# Patient Record
Sex: MALE | Race: WHITE | Employment: FULL TIME | URBAN - NONMETROPOLITAN AREA
[De-identification: names, ages, dates, MRNs, and addresses within clinical notes are randomized per-mention and may not be internally consistent; named-entity substitution may affect disease eponyms.]

---

## 2018-07-06 ENCOUNTER — OFFICE VISIT (OUTPATIENT)
Dept: FAMILY MEDICINE | Facility: CLINIC | Age: 28
End: 2018-07-06
Payer: COMMERCIAL

## 2018-07-06 VITALS
RESPIRATION RATE: 18 BRPM | HEIGHT: 70 IN | HEART RATE: 68 BPM | SYSTOLIC BLOOD PRESSURE: 120 MMHG | WEIGHT: 193 LBS | TEMPERATURE: 97.8 F | DIASTOLIC BLOOD PRESSURE: 86 MMHG | BODY MASS INDEX: 27.63 KG/M2

## 2018-07-06 DIAGNOSIS — R22.2 LUMP OF SKIN OF BACK: ICD-10-CM

## 2018-07-06 DIAGNOSIS — R19.09 GROIN LUMP: Primary | ICD-10-CM

## 2018-07-06 LAB
ALBUMIN UR-MCNC: NEGATIVE MG/DL
APPEARANCE UR: CLEAR
BILIRUB UR QL STRIP: NEGATIVE
COLOR UR AUTO: YELLOW
GLUCOSE UR STRIP-MCNC: NEGATIVE MG/DL
HGB UR QL STRIP: NEGATIVE
KETONES UR STRIP-MCNC: NEGATIVE MG/DL
LEUKOCYTE ESTERASE UR QL STRIP: NEGATIVE
NITRATE UR QL: NEGATIVE
PH UR STRIP: 7 PH (ref 5–7)
RBC #/AREA URNS AUTO: NORMAL /HPF
SOURCE: NORMAL
SP GR UR STRIP: 1.02 (ref 1–1.03)
UROBILINOGEN UR STRIP-ACNC: 0.2 EU/DL (ref 0.2–1)
WBC #/AREA URNS AUTO: NORMAL /HPF

## 2018-07-06 PROCEDURE — 87591 N.GONORRHOEAE DNA AMP PROB: CPT | Performed by: FAMILY MEDICINE

## 2018-07-06 PROCEDURE — 81001 URINALYSIS AUTO W/SCOPE: CPT | Performed by: FAMILY MEDICINE

## 2018-07-06 PROCEDURE — 36415 COLL VENOUS BLD VENIPUNCTURE: CPT | Performed by: FAMILY MEDICINE

## 2018-07-06 PROCEDURE — 87389 HIV-1 AG W/HIV-1&-2 AB AG IA: CPT | Performed by: FAMILY MEDICINE

## 2018-07-06 PROCEDURE — 99203 OFFICE O/P NEW LOW 30 MIN: CPT | Performed by: FAMILY MEDICINE

## 2018-07-06 PROCEDURE — 87491 CHLMYD TRACH DNA AMP PROBE: CPT | Performed by: FAMILY MEDICINE

## 2018-07-06 NOTE — MR AVS SNAPSHOT
After Visit Summary   7/6/2018    Vik Mercer    MRN: [id number]           Patient Information     Date Of Birth          1990        Visit Information        Provider Department      7/6/2018 8:40 AM Kian Kaufman MD [facility]        Today's Diagnoses     Groin lump    -  1    Lump of skin of back          Care Instructions    Please call [phone] to schedule ultrasound.   Schedule general surgery consult as discussed.          Follow-ups after your visit        Additional Services     GENERAL SURG ADULT REFERRAL       Your provider has referred you to: [facility]: [facility] [phone]   [email or web address]    Please be aware that coverage of these services is subject to the terms and limitations of your health insurance plan.  Call member services at your health plan with any benefit or coverage questions.      Please bring the following with you to your appointment:    (1) Any X-Rays, CTs or MRIs which have been performed.  Contact the facility where they were done to arrange for  prior to your scheduled appointment.   (2) List of current medications   (3) This referral request   (4) Any documents/labs given to you for this referral                  Future tests that were ordered for you today     Open Future Orders        Priority Expected Expires Ordered    US Abdomen Limited Routine  7/6/2019 7/6/2018    US Abdomen Limited Routine  7/6/2019 7/6/2018            Who to contact     If you have questions or need follow up information about today's clinic visit or your schedule please contact [facility] directly at [phone].  Normal or non-critical lab and imaging results will be communicated to you by MyChart, letter or phone within 4 business days after the clinic has received the results. If you do not hear from us within 7 days, please contact the clinic through MyChart or phone. If you have  "a critical or abnormal lab result, we will notify you by phone as soon as possible.  Submit refill requests through [employer] or call your pharmacy and they will forward the refill request to us. Please allow 3 business days for your refill to be completed.          Additional Information About Your Visit        Care EveryWhere ID     This is your Care EveryWhere ID. This could be used by other organizations to access your [place] medical records  [id number]        Your Vitals Were     Pulse Temperature Respirations Height BMI (Body Mass Index)       68 97.8  F (36.6  C) (Tympanic) 18 5' 10\" (1.778 m) 27.69 kg/m2        Blood Pressure from Last 3 Encounters:   07/06/18 120/86   03/27/14 120/73   05/29/13 116/73    Weight from Last 3 Encounters:   07/06/18 193 lb (87.5 kg)   11/25/11 179 lb 12.8 oz (81.6 kg)   08/25/11 172 lb 6.4 oz (78.2 kg)              We Performed the Following     Chlamydia trachomatis PCR     GENERAL SURG ADULT REFERRAL     HIV Antigen Antibody Combo     Neisseria gonorrhoeae PCR     UA with Microscopic        Primary Care Provider Office Phone # Fax #    Jagdeep Mckinney [phone]       XXX RESIGNED  [address] [facility] [address]        Equal Access to Services     KAIDEN KHAN : Hadii aad ku hadasho Soomaali, waaxda luqadaha, qaybta kaalmada adeegyada, waxay idiin haycarmita hoffman . So [facility] [phone].    ATENCIÓN: Si habla español, tiene a brewer disposición servicios gratuitos de asistencia lingüística. Llame al [phone].    We comply with applicable federal civil rights laws and [place] laws. We do not discriminate on the basis of race, color, national origin, age, disability, sex, sexual orientation, or gender identity.            Thank you!     Thank you for choosing [facility]  for your care. Our goal is always to provide you with excellent care. Hearing back from our patients is one way we can continue to improve our services. " Please take a few minutes to complete the written survey that you may receive in the mail after your visit with us. Thank you!             Your Updated Medication List - Protect others around you: Learn how to safely use, store and throw away your medicines at www.disposemymeds.org.      Notice  As of 7/6/2018 10:17 AM    You have not been prescribed any medications.

## 2018-07-06 NOTE — LETTER
July 10, 2018      Vik Mercer  [address] [address]        Dear ,    We are writing to inform you of your test results.    Your blood and urine tests were normal/negative.    Ultrasound abdomen raises possibility of small inguinal hernia and abdominal wall lipoma.  Schedule an appointment with general surgery as discussed earlier.    Resulted Orders   Chlamydia trachomatis PCR   Result Value Ref Range    Specimen Description Urine     Chlamydia Trachomatis PCR Negative NEG^Negative      Comment:      Negative for C. trachomatis rRNA by transcription mediated amplification.  A negative result by transcription mediated amplification does not preclude   the presence of C. trachomatis infection because results are dependent on   proper and adequate collection, absence of inhibitors, and sufficient rRNA to   be detected.     Neisseria gonorrhoeae PCR   Result Value Ref Range    Specimen Descrip Urine     N Gonorrhea PCR Negative NEG^Negative      Comment:      Negative for N. gonorrhoeae rRNA by transcription mediated amplification.  A negative result by transcription mediated amplification does not preclude   the presence of N. gonorrhoeae infection because results are dependent on   proper and adequate collection, absence of inhibitors, and sufficient rRNA to   be detected.     UA with Microscopic   Result Value Ref Range    Color Urine Yellow     Appearance Urine Clear     Glucose Urine Negative NEG^Negative mg/dL    Bilirubin Urine Negative NEG^Negative    Ketones Urine Negative NEG^Negative mg/dL    Specific Gravity Urine 1.020 1.003 - 1.035    pH Urine 7.0 5.0 - 7.0 pH    Protein Albumin Urine Negative NEG^Negative mg/dL    Urobilinogen Urine 0.2 0.2 - 1.0 EU/dL    Nitrite Urine Negative NEG^Negative    Blood Urine Negative NEG^Negative    Leukocyte Esterase Urine Negative NEG^Negative    Source Midstream Urine     WBC Urine 0 - 5 OTO5^0 - 5 /HPF    RBC Urine O - 2 OTO2^O - 2 /HPF    HIV Antigen Antibody Combo   Result Value Ref Range    HIV Antigen Antibody Combo Nonreactive NR^Nonreactive          Comment:      HIV-1 p24 Ag & HIV-1/HIV-2 Ab Not Detected       If you have any questions or concerns, please call the clinic at the number listed above.       Sincerely,        Kian Kaufman MD/jena

## 2018-07-06 NOTE — PROGRESS NOTES
SUBJECTIVE:   Vik Mercer is a 27 year old male who presents to clinic today for the following health issues:      Lump      Duration: Back has been there for awhile, Groin he just noticed     Description (location/character/radiation): One on the right side of his back, one on the belt line and one in the groin area     Intensity:  moderate    Accompanying signs and symptoms: Lump in groin causes discomfort at times     History (similar episodes/previous evaluation): None    Precipitating or alleviating factors: None    Therapies tried and outcome: None         Problem list and histories reviewed & adjusted, as indicated.  Additional history: as documented    Patient Active Problem List   Diagnosis     Bronchitis     CARDIOVASCULAR SCREENING; LDL GOAL LESS THAN 160     Mild major depression (H)     Generalized anxiety disorder     History reviewed. No pertinent surgical history.    Social History   Substance Use Topics     Smoking status: Never Smoker     Smokeless tobacco: Never Used     Alcohol use Yes      Comment: Occ     History reviewed. No pertinent family history.      No current outpatient prescriptions on file.     No Known Allergies  Recent Labs   Lab Test  05/29/13   1020  03/14/11   1430   ALT  32  18   CR  0.97  0.99   GFRESTIMATED  >90  >90   GFRESTBLACK  >90  >90   POTASSIUM  4.2  4.4   TSH   --   1.38      BP Readings from Last 3 Encounters:   07/06/18 120/86   03/27/14 120/73   05/29/13 116/73    Wt Readings from Last 3 Encounters:   07/06/18 193 lb (87.5 kg)   11/25/11 179 lb 12.8 oz (81.6 kg)   08/25/11 172 lb 6.4 oz (78.2 kg)                  Labs reviewed in EPIC    Reviewed and updated as needed this visit by clinical staff       Reviewed and updated as needed this visit by Provider         ROS:  Constitutional, HEENT, cardiovascular, pulmonary, gi and gu systems are negative, except as otherwise noted.    OBJECTIVE:     /86 (Cuff Size: Adult Regular)  Pulse 68  Temp 97.8  F (36.6  " C) (Tympanic)  Resp 18  Ht 5' 10\" (1.778 m)  Wt 193 lb (87.5 kg)  BMI 27.69 kg/m2  Body mass index is 27.69 kg/(m^2).  GENERAL: alert and no distress  NECK: no adenopathy, no asymmetry, masses, or scars and thyroid normal to palpation  RESP: lungs clear to auscultation - no rales, rhonchi or wheezes  CV: regular rate and rhythm, normal S1 S2, no S3 or S4, no murmur, click or rub, no peripheral edema and peripheral pulses strong  ABDOMEN: soft, nontender, no hepatosplenomegaly, no masses and bowel sounds normal   (male): testicles normal without atrophy or masses, no hernias, discrete small lump involving groin, nontender, about 1.5x 1 cm  MS: no gross musculoskeletal defects noted, no edema  SKIN: 2 x small swellings on the right back, upper one about 1.5x1cm non-tender, skin colored and lower one about 1 x1 cm, with some dark brownish skin discoloration      ASSESSMENT/PLAN:     1. Groin lump  -Swelling started about a week ago, suspect due to benign etiology, STD screening test ordered as well, will do ultrasound for further evaluation  - US Abdomen Limited; Future  - Chlamydia trachomatis PCR  - Neisseria gonorrhoeae PCR  - UA with Microscopic  - HIV Antigen Antibody Combo  - GENERAL SURG ADULT REFERRAL      2. Lump of skin of back  -Has been there for many years, probably related to lipoma, will do ultrasound for further evaluation and general surgery referral placed  - US Abdomen Limited; Future  - GENERAL SURG ADULT REFERRAL      Patient Instructions   Please call [phone] to schedule ultrasound.   Schedule general surgery consult as discussed.      Kian Kaufman MD  [facility]  "

## 2018-07-07 ENCOUNTER — HOSPITAL ENCOUNTER (OUTPATIENT)
Dept: ULTRASOUND IMAGING | Facility: CLINIC | Age: 28
Discharge: HOME | End: 2018-07-07
Attending: FAMILY MEDICINE | Admitting: FAMILY MEDICINE
Payer: COMMERCIAL

## 2018-07-07 ENCOUNTER — HOSPITAL ENCOUNTER (EMERGENCY)
Facility: CLINIC | Age: 28
End: 2018-07-07
Payer: COMMERCIAL

## 2018-07-07 DIAGNOSIS — R19.09 GROIN LUMP: ICD-10-CM

## 2018-07-07 PROCEDURE — 76705 ECHO EXAM OF ABDOMEN: CPT

## 2018-07-09 LAB — HIV 1+2 AB+HIV1 P24 AG SERPL QL IA: NONREACTIVE

## 2018-07-10 LAB
C TRACH DNA SPEC QL NAA+PROBE: NEGATIVE
N GONORRHOEA DNA SPEC QL NAA+PROBE: NEGATIVE
SPECIMEN SOURCE: NORMAL
SPECIMEN SOURCE: NORMAL